# Patient Record
Sex: FEMALE | Race: WHITE | NOT HISPANIC OR LATINO | URBAN - METROPOLITAN AREA
[De-identification: names, ages, dates, MRNs, and addresses within clinical notes are randomized per-mention and may not be internally consistent; named-entity substitution may affect disease eponyms.]

---

## 2020-07-25 ENCOUNTER — TELEPHONE ENCOUNTER (OUTPATIENT)
Dept: URBAN - METROPOLITAN AREA CLINIC 13 | Facility: CLINIC | Age: 60
End: 2020-07-25

## 2020-07-26 ENCOUNTER — TELEPHONE ENCOUNTER (OUTPATIENT)
Dept: URBAN - METROPOLITAN AREA CLINIC 13 | Facility: CLINIC | Age: 60
End: 2020-07-26

## 2020-07-26 RX ORDER — SPIRONOLACTONE 25 MG/1
TAKE 1 TABLET DAILY TABLET ORAL
Refills: 0 | Status: ACTIVE | COMMUNITY

## 2020-07-26 RX ORDER — FOLIC ACID 0.8 MG
TAKE AS DIRECTED TABLET ORAL
Refills: 0 | Status: ACTIVE | COMMUNITY

## 2020-07-26 RX ORDER — TRAZODONE HYDROCHLORIDE 50 MG/1
TAKE 1 TABLET BEDTIME TABLET ORAL
Qty: 30 | Refills: 6 | Status: ACTIVE | COMMUNITY

## 2020-07-26 RX ORDER — FUROSEMIDE 20 MG/1
TAKE 1 TABLET BY MOUTH DAILY TABLET ORAL
Qty: 30 | Refills: 0 | Status: ACTIVE | COMMUNITY

## 2020-07-26 RX ORDER — METOPROLOL TARTRATE 25 MG/1
TAKE 1 TABLET DAILY TABLET, FILM COATED ORAL
Refills: 0 | Status: ACTIVE | COMMUNITY

## 2020-07-26 RX ORDER — OMEPRAZOLE 40 MG/1
TAKE 1 CAPSULE DAILY CAPSULE, DELAYED RELEASE ORAL
Qty: 30 | Refills: 1 | Status: ACTIVE | COMMUNITY

## 2020-07-26 RX ORDER — POTASSIUM CHLORIDE 1500 MG/1
TAKE 1 TABLET DAILY TABLET, EXTENDED RELEASE ORAL
Refills: 0 | Status: ACTIVE | COMMUNITY

## 2020-07-26 RX ORDER — ALENDRONATE SODIUM 70 MG/1
TAKE 1 TABLET ONCE WEEKLY TABLET ORAL
Refills: 0 | Status: ACTIVE | COMMUNITY

## 2020-07-26 RX ORDER — LACTULOSE 10 G/15ML
TAKE 15 ML BY MOUTH THREE TIMES DAILY SOLUTION ORAL
Qty: 4050 | Refills: 3 | Status: ACTIVE | COMMUNITY

## 2020-07-26 RX ORDER — TRAMADOL HYDROCHLORIDE 50 MG/1
TAKE 1 TABLET BY MOUTH EVERY 6 HOURS AS NEEDED FOR PAIN TABLET ORAL
Qty: 50 | Refills: 3 | Status: ACTIVE | COMMUNITY

## 2022-03-04 ENCOUNTER — OFFICE VISIT (OUTPATIENT)
Dept: URBAN - METROPOLITAN AREA CLINIC 113 | Facility: CLINIC | Age: 62
End: 2022-03-04

## 2022-03-17 ENCOUNTER — OFFICE VISIT (OUTPATIENT)
Dept: URBAN - METROPOLITAN AREA CLINIC 113 | Facility: CLINIC | Age: 62
End: 2022-03-17

## 2022-05-02 ENCOUNTER — OFFICE VISIT (OUTPATIENT)
Dept: URBAN - METROPOLITAN AREA CLINIC 113 | Facility: CLINIC | Age: 62
End: 2022-05-02
Payer: MEDICARE

## 2022-05-02 VITALS
SYSTOLIC BLOOD PRESSURE: 96 MMHG | HEART RATE: 58 BPM | DIASTOLIC BLOOD PRESSURE: 56 MMHG | TEMPERATURE: 97.3 F | RESPIRATION RATE: 20 BRPM | HEIGHT: 64 IN

## 2022-05-02 DIAGNOSIS — K72.90 HEPATIC ENCEPHALOPATHY: ICD-10-CM

## 2022-05-02 DIAGNOSIS — R74.8 ELEVATED LIVER ENZYMES: ICD-10-CM

## 2022-05-02 DIAGNOSIS — K70.30 ALCOHOLIC CIRRHOSIS OF LIVER WITHOUT ASCITES: ICD-10-CM

## 2022-05-02 PROCEDURE — 99244 OFF/OP CNSLTJ NEW/EST MOD 40: CPT | Performed by: INTERNAL MEDICINE

## 2022-05-02 RX ORDER — OMEPRAZOLE 40 MG/1
TAKE 1 CAPSULE DAILY CAPSULE, DELAYED RELEASE ORAL
Qty: 30 | Refills: 1 | Status: ACTIVE | COMMUNITY

## 2022-05-02 RX ORDER — TRAMADOL HYDROCHLORIDE 50 MG/1
TAKE 1 TABLET BY MOUTH EVERY 6 HOURS AS NEEDED FOR PAIN TABLET ORAL
Qty: 50 | Refills: 3 | Status: ACTIVE | COMMUNITY

## 2022-05-02 RX ORDER — TRAZODONE HYDROCHLORIDE 50 MG/1
TAKE 1 TABLET BEDTIME TABLET ORAL
Qty: 30 | Refills: 6 | Status: ACTIVE | COMMUNITY

## 2022-05-02 RX ORDER — FOLIC ACID 0.8 MG
TAKE AS DIRECTED TABLET ORAL
Refills: 0 | Status: ACTIVE | COMMUNITY

## 2022-05-02 RX ORDER — SPIRONOLACTONE 25 MG/1
TAKE 1 TABLET DAILY TABLET ORAL
Refills: 0 | Status: ACTIVE | COMMUNITY

## 2022-05-02 RX ORDER — POTASSIUM CHLORIDE 1500 MG/1
TAKE 1 TABLET DAILY TABLET, EXTENDED RELEASE ORAL
Refills: 0 | Status: ACTIVE | COMMUNITY

## 2022-05-02 RX ORDER — FUROSEMIDE 20 MG/1
TAKE 1 TABLET BY MOUTH DAILY TABLET ORAL
Qty: 30 | Refills: 0 | Status: ACTIVE | COMMUNITY

## 2022-05-02 RX ORDER — ALENDRONATE SODIUM 70 MG/1
TAKE 1 TABLET ONCE WEEKLY TABLET ORAL
Refills: 0 | Status: ACTIVE | COMMUNITY

## 2022-05-02 RX ORDER — LACTULOSE 10 G/15ML
TAKE 15 ML BY MOUTH THREE TIMES DAILY SOLUTION ORAL
Qty: 4050 | Refills: 3 | Status: ACTIVE | COMMUNITY

## 2022-05-02 RX ORDER — METOPROLOL TARTRATE 25 MG/1
TAKE 1 TABLET DAILY TABLET, FILM COATED ORAL
Refills: 0 | Status: ACTIVE | COMMUNITY

## 2022-05-02 NOTE — HPI-TODAY'S VISIT:
The patient was referred by Dr. Austin Faye for elevated liver enzymes .   A copy of this document is being forwarded to the referring provider.  61-year-old female referred by her primary care physician for elevated liver enzymes.  Her most recent labs were performed on February 8, 2022.  She did have a sodium of 142, potassium 4.5, creatinine 0.99, ammonia 113.  The remainder of her labs were not sent.  She did have an ultrasound performed which demonstrated a cirrhotic appearance to her liver.  There is no evidence of ascites or any discrete mass.  She was previously seen in our clinic in 2015 for alcoholic cirrhosis.  At that time she had a history of hepatitis C diagnosed over 10 years ago possibly from a blood transfusion.  We did schedule an upper endoscopy, and abdominal ultrasound, hepatitis C studies, and recommended lactulose, diuretics, and a PPI.  She did not follow up with us.  She states she was then seen by Dr. Goncalves and maybe had an EGD/CSC about 3 years ago. She currently feels "ok". Sometimes she has confusion and feels "ill". She says that the lactulose is helping her alot. She does not drink an ETOH since 2014. She denies any melena or hematemesis.

## 2022-05-03 LAB
A/G RATIO: 1
AFP, SERUM, TUMOR MARKER: 2.1
ALBUMIN: 2.4
ALKALINE PHOSPHATASE: 111
ALT (SGPT): 22
AST (SGOT): 33
BASO (ABSOLUTE): 0.1
BASOS: 1
BILIRUBIN, TOTAL: 1.8
BUN/CREATININE RATIO: 10
BUN: 8
CALCIUM: 7.9
CARBON DIOXIDE, TOTAL: 23
CHLORIDE: 108
CREATININE: 0.77
EGFR: 87
EOS (ABSOLUTE): 0.4
EOS: 5
GLOBULIN, TOTAL: 2.4
GLUCOSE: 73
HEMATOCRIT: 34.8
HEMATOLOGY COMMENTS:: (no result)
HEMOGLOBIN: 11.7
IMMATURE CELLS: (no result)
IMMATURE GRANS (ABS): 0
IMMATURE GRANULOCYTES: 0
INR: 1.3
LYMPHS (ABSOLUTE): 2.3
LYMPHS: 32
MCH: 31.7
MCHC: 33.6
MCV: 94
MONOCYTES(ABSOLUTE): 0.7
MONOCYTES: 10
NEUTROPHILS (ABSOLUTE): 3.6
NEUTROPHILS: 52
NRBC: (no result)
PLATELETS: 171
POTASSIUM: 4.6
PROTEIN, TOTAL: 4.8
PROTHROMBIN TIME: 13
RBC: 3.69
RDW: 15.6
SODIUM: 141
WBC: 7.1

## 2022-05-18 ENCOUNTER — OFFICE VISIT (OUTPATIENT)
Dept: URBAN - METROPOLITAN AREA MEDICAL CENTER 19 | Facility: MEDICAL CENTER | Age: 62
End: 2022-05-18

## 2022-05-27 ENCOUNTER — TELEPHONE ENCOUNTER (OUTPATIENT)
Dept: URBAN - METROPOLITAN AREA CLINIC 113 | Facility: CLINIC | Age: 62
End: 2022-05-27

## 2022-05-31 ENCOUNTER — OFFICE VISIT (OUTPATIENT)
Dept: URBAN - METROPOLITAN AREA CLINIC 113 | Facility: CLINIC | Age: 62
End: 2022-05-31

## 2022-05-31 NOTE — HPI-TODAY'S VISIT:
61-year-old female presents for follow-up after EGD.  She was last seen on 5/2/2022 as a referral for elevated liver enzymes.  She did admit to a history of cirrhosis and lack of previous EGD for varices surveillance.  Cirrhosis is suspected to be secondary to alcohol use though patient denied alcohol consumption since 2014.  She was recommended to continue her lactulose and Xifaxan as well as her current diuretic regimen.  She was therefore planned for an EGD with possible banding.  She was also planned for updated blood work to include CBC, CMP, PT/INR, AFP.  Prior colonoscopy records were requested from Dr. Goncalves.  Patient wishes to have care closer to her home in [place] so she may follow-up with Dr. Goncalves in [place].  We would continue her care here in [place] for now.  EGD was scheduled for 5/18/2022 however this was canceled for unknown reasons.   Labs 5/2/2022:Normal AFP, mildly elevated TB 1.8, normal ALP, normal AST/ALT, low albumin 2.4, normal sodium 141, normal creatinine 0.77, normal CBC, elevated PT/INR 13/1.3.  Meld score of 12.   5/2/22: The patient was referred by Dr. Austin Faye for elevated liver enzymes .   A copy of this document is being forwarded to the referring provider.  61-year-old female referred by her primary care physician for elevated liver enzymes.  Her most recent labs were performed on February 8, 2022.  She did have a sodium of 142, potassium 4.5, creatinine 0.99, ammonia 113.  The remainder of her labs were not sent.  She did have an ultrasound performed which demonstrated a cirrhotic appearance to her liver.  There is no evidence of ascites or any discrete mass.  She was previously seen in our clinic in 2015 for alcoholic cirrhosis.  At that time she had a history of hepatitis C diagnosed over 10 years ago possibly from a blood transfusion.  We did schedule an upper endoscopy, and abdominal ultrasound, hepatitis C studies, and recommended lactulose, diuretics, and a PPI.  She did not follow up with us.  She states she was then seen by Dr. Goncalves and maybe had an EGD/CSC about 3 years ago. She currently feels "ok". Sometimes she has confusion and feels "ill". She says that the lactulose is helping her alot. She does not drink an ETOH since 2014. She denies any melena or hematemesis.

## 2022-07-12 ENCOUNTER — OFFICE VISIT (OUTPATIENT)
Dept: URBAN - METROPOLITAN AREA MEDICAL CENTER 19 | Facility: MEDICAL CENTER | Age: 62
End: 2022-07-12
Payer: MEDICARE

## 2022-07-12 DIAGNOSIS — K74.60 ADVANCED CIRRHOSIS: ICD-10-CM

## 2022-07-12 DIAGNOSIS — K31.89 ACQUIRED DEFORMITY OF DUODENUM: ICD-10-CM

## 2022-07-12 PROCEDURE — 43235 EGD DIAGNOSTIC BRUSH WASH: CPT | Performed by: INTERNAL MEDICINE

## 2022-07-12 RX ORDER — TRAZODONE HYDROCHLORIDE 50 MG/1
TAKE 1 TABLET BEDTIME TABLET ORAL
Qty: 30 | Refills: 6 | Status: ACTIVE | COMMUNITY

## 2022-07-12 RX ORDER — OMEPRAZOLE 40 MG/1
TAKE 1 CAPSULE DAILY CAPSULE, DELAYED RELEASE ORAL
Qty: 30 | Refills: 1 | Status: ACTIVE | COMMUNITY

## 2022-07-12 RX ORDER — POTASSIUM CHLORIDE 1500 MG/1
TAKE 1 TABLET DAILY TABLET, EXTENDED RELEASE ORAL
Refills: 0 | Status: ACTIVE | COMMUNITY

## 2022-07-12 RX ORDER — METOPROLOL TARTRATE 25 MG/1
TAKE 1 TABLET DAILY TABLET, FILM COATED ORAL
Refills: 0 | Status: ACTIVE | COMMUNITY

## 2022-07-12 RX ORDER — FOLIC ACID 0.8 MG
TAKE AS DIRECTED TABLET ORAL
Refills: 0 | Status: ACTIVE | COMMUNITY

## 2022-07-12 RX ORDER — ALENDRONATE SODIUM 70 MG/1
TAKE 1 TABLET ONCE WEEKLY TABLET ORAL
Refills: 0 | Status: ACTIVE | COMMUNITY

## 2022-07-12 RX ORDER — FUROSEMIDE 20 MG/1
TAKE 1 TABLET BY MOUTH DAILY TABLET ORAL
Qty: 30 | Refills: 0 | Status: ACTIVE | COMMUNITY

## 2022-07-12 RX ORDER — SPIRONOLACTONE 25 MG/1
TAKE 1 TABLET DAILY TABLET ORAL
Refills: 0 | Status: ACTIVE | COMMUNITY

## 2022-07-12 RX ORDER — LACTULOSE 10 G/15ML
TAKE 15 ML BY MOUTH THREE TIMES DAILY SOLUTION ORAL
Qty: 4050 | Refills: 3 | Status: ACTIVE | COMMUNITY

## 2022-07-12 RX ORDER — TRAMADOL HYDROCHLORIDE 50 MG/1
TAKE 1 TABLET BY MOUTH EVERY 6 HOURS AS NEEDED FOR PAIN TABLET ORAL
Qty: 50 | Refills: 3 | Status: ACTIVE | COMMUNITY

## 2022-07-26 ENCOUNTER — WEB ENCOUNTER (OUTPATIENT)
Dept: URBAN - METROPOLITAN AREA CLINIC 113 | Facility: CLINIC | Age: 62
End: 2022-07-26

## 2022-07-26 ENCOUNTER — OFFICE VISIT (OUTPATIENT)
Dept: URBAN - METROPOLITAN AREA CLINIC 113 | Facility: CLINIC | Age: 62
End: 2022-07-26
Payer: MEDICARE

## 2022-07-26 ENCOUNTER — DASHBOARD ENCOUNTERS (OUTPATIENT)
Age: 62
End: 2022-07-26

## 2022-07-26 VITALS
HEIGHT: 64 IN | SYSTOLIC BLOOD PRESSURE: 120 MMHG | TEMPERATURE: 97.6 F | HEART RATE: 57 BPM | DIASTOLIC BLOOD PRESSURE: 68 MMHG | RESPIRATION RATE: 20 BRPM

## 2022-07-26 DIAGNOSIS — K72.90 HEPATIC ENCEPHALOPATHY: ICD-10-CM

## 2022-07-26 DIAGNOSIS — R74.8 ELEVATED LIVER ENZYMES: ICD-10-CM

## 2022-07-26 DIAGNOSIS — K70.30 ALCOHOLIC CIRRHOSIS OF LIVER WITHOUT ASCITES: ICD-10-CM

## 2022-07-26 PROBLEM — 420054005: Status: ACTIVE | Noted: 2022-05-02

## 2022-07-26 PROCEDURE — 99213 OFFICE O/P EST LOW 20 MIN: CPT

## 2022-07-26 RX ORDER — CYANOCOBALAMIN (VITAMIN B-12) 500 MCG
1/2 TABLET TABLET ORAL ONCE A DAY
Status: ACTIVE | COMMUNITY

## 2022-07-26 RX ORDER — POTASSIUM CHLORIDE 1500 MG/1
TAKE 1 TABLET DAILY TABLET, EXTENDED RELEASE ORAL
Refills: 0 | Status: ACTIVE | COMMUNITY

## 2022-07-26 RX ORDER — FLUTICASONE PROPIONATE 50 UG/1
1 SPRAY IN EACH NOSTRIL SPRAY, METERED NASAL ONCE A DAY
Status: ACTIVE | COMMUNITY

## 2022-07-26 RX ORDER — SPIRONOLACTONE 25 MG/1
TAKE 1 TABLET DAILY TABLET ORAL
Refills: 0 | Status: ACTIVE | COMMUNITY

## 2022-07-26 RX ORDER — METOPROLOL TARTRATE 25 MG/1
TAKE 1 TABLET DAILY TABLET, FILM COATED ORAL TWICE A DAY
Refills: 0 | Status: ACTIVE | COMMUNITY

## 2022-07-26 RX ORDER — CETIRIZINE HCL 1 MG/ML
1 TABLET SOLUTION, ORAL ORAL ONCE A DAY
Status: ACTIVE | COMMUNITY

## 2022-07-26 RX ORDER — OMEPRAZOLE 40 MG/1
TAKE 1 CAPSULE DAILY CAPSULE, DELAYED RELEASE ORAL
Qty: 30 | Refills: 1 | Status: ON HOLD | COMMUNITY

## 2022-07-26 RX ORDER — ASCORBIC ACID 1000 MG
1 CAPSULE TABLET ORAL ONCE A DAY
Status: ACTIVE | COMMUNITY

## 2022-07-26 RX ORDER — SUCRALFATE 1 G/1
1 TABLET ON AN EMPTY STOMACH TABLET ORAL ONCE A DAY
Status: ACTIVE | COMMUNITY

## 2022-07-26 RX ORDER — FOLIC ACID 0.8 MG
TAKE AS DIRECTED TABLET ORAL
Refills: 0 | Status: ACTIVE | COMMUNITY

## 2022-07-26 RX ORDER — PANTOPRAZOLE SODIUM 40 MG/1
1 TABLET TABLET, DELAYED RELEASE ORAL ONCE A DAY
Status: ACTIVE | COMMUNITY

## 2022-07-26 RX ORDER — FUROSEMIDE 20 MG/1
TAKE 1 TABLET BY MOUTH DAILY TABLET ORAL TWICE A DAY
Refills: 0 | Status: ACTIVE | COMMUNITY

## 2022-07-26 RX ORDER — BUPROPION HYDROCHLORIDE 75 MG/1
2 TABLETS TABLET, FILM COATED ORAL TWICE A DAY
Status: ACTIVE | COMMUNITY

## 2022-07-26 RX ORDER — RIFAXIMIN 550 MG/1
1 TABLET TABLET ORAL TWICE A DAY
Status: ACTIVE | COMMUNITY

## 2022-07-26 RX ORDER — CYANOCOBALAMIN 1000 UG/ML
AS DIRECTED INJECTION INTRAMUSCULAR; SUBCUTANEOUS
Status: ACTIVE | COMMUNITY

## 2022-07-26 RX ORDER — HYDROCODONE BITARTRATE AND ACETAMINOPHEN 5; 325 MG/1; MG/1
1 TABLET AS NEEDED TABLET ORAL
Status: ACTIVE | COMMUNITY

## 2022-07-26 RX ORDER — TRAZODONE HYDROCHLORIDE 50 MG/1
TAKE 1 TABLET BEDTIME TABLET ORAL
Qty: 30 | Refills: 6 | Status: ON HOLD | COMMUNITY

## 2022-07-26 RX ORDER — ALENDRONATE SODIUM 70 MG/1
TAKE 1 TABLET ONCE WEEKLY TABLET ORAL
Refills: 0 | Status: ACTIVE | COMMUNITY

## 2022-07-26 RX ORDER — ESCITALOPRAM OXALATE 20 MG/1
1 TABLET TABLET, FILM COATED ORAL ONCE A DAY
Status: ACTIVE | COMMUNITY

## 2022-07-26 RX ORDER — CALCIUM CARBONATE 500 MG/1
1 TABLET TABLET ORAL ONCE A DAY
Status: ACTIVE | COMMUNITY

## 2022-07-26 RX ORDER — TRAMADOL HYDROCHLORIDE 50 MG/1
TAKE 1 TABLET BY MOUTH EVERY 6 HOURS AS NEEDED FOR PAIN TABLET ORAL
Qty: 50 | Refills: 3 | Status: ON HOLD | COMMUNITY

## 2022-07-26 RX ORDER — ALBUTEROL SULFATE 90 UG/1
1 PUFF AS NEEDED AEROSOL, METERED RESPIRATORY (INHALATION)
Status: ACTIVE | COMMUNITY

## 2022-07-26 RX ORDER — ASPIRIN 81 MG/1
1 TABLET TABLET, COATED ORAL ONCE A DAY
Status: ACTIVE | COMMUNITY

## 2022-07-26 RX ORDER — LACTULOSE 10 G/15ML
TAKE 15 ML BY MOUTH THREE TIMES DAILY SOLUTION ORAL
Qty: 4050 | Refills: 3 | Status: ACTIVE | COMMUNITY

## 2022-07-26 RX ORDER — ACETAMINOPHEN 325 MG
2 CAPSULE AS NEEDED TABLET ORAL
Status: ACTIVE | COMMUNITY

## 2022-07-26 NOTE — HPI-TODAY'S VISIT:
61-year-old female presents for follow-up after EGD.  She was last seen on 5/2/2022 as a referral for elevated liver enzymes.  She did admit to a history of cirrhosis and lack of previous EGD for varices surveillance.  Cirrhosis is suspected to be secondary to alcohol use though patient denied alcohol consumption since 2014.  She was recommended to continue her lactulose and Xifaxan as well as her current diuretic regimen.  She was therefore planned for an EGD with possible banding.  She was also planned for updated blood work to include CBC, CMP, PT/INR, AFP.  Prior colonoscopy records were requested from Dr. Goncalves.  Patient wishes to have care closer to her home in [place] so she may follow-up with Dr. Goncalves in [place].  We would continue her care here in [place] for now.  EGD was scheduled for 5/18/2022 however this was canceled for unknown reasons.   Labs 5/2/2022:Normal AFP, mildly elevated TB 1.8, normal ALP, normal AST/ALT, low albumin 2.4, normal sodium 141, normal creatinine 0.77, normal CBC, elevated PT/INR 13/1.3.  Meld score of 12.   EGD 7/12/2022:Performed without difficulty.  Normal esophagus.  Gastritis was noted however not biopsied.  Normal duodenum.  No specimens collected.  Repeat upper endoscopy in 3 years for screening of esophageal varices.  We do not have records from Dr. Goncalves as of yet.   She states she moved to [place] for a new nursing home however she intends to go back to Dr. Goncalves for her GI care out of [place]. She has remained abstinent since 2014. She continues to take Lactulose. She takes Lasix 20 mg BID and spironolactone 50 mg once daily. This has resolved her lower extremity swelling. She denies confusion, jaundice, icterus, hematemesis, blood per rectum, melena or abdominal pain/distention.   5/2/22: The patient was referred by Dr. Austin Faye for elevated liver enzymes .   A copy of this document is being forwarded to the referring provider.  61-year-old female referred by her primary care physician for elevated liver enzymes.  Her most recent labs were performed on February 8, 2022.  She did have a sodium of 142, potassium 4.5, creatinine 0.99, ammonia 113.  The remainder of her labs were not sent.  She did have an ultrasound performed which demonstrated a cirrhotic appearance to her liver.  There is no evidence of ascites or any discrete mass.  She was previously seen in our clinic in 2015 for alcoholic cirrhosis.  At that time she had a history of hepatitis C diagnosed over 10 years ago possibly from a blood transfusion.  We did schedule an upper endoscopy, and abdominal ultrasound, hepatitis C studies, and recommended lactulose, diuretics, and a PPI.  She did not follow up with us.  She states she was then seen by Dr. Goncalves and maybe had an EGD/CSC about 3 years ago. She currently feels "ok". Sometimes she has confusion and feels "ill". She says that the lactulose is helping her a lot. She does not drink an ETOH since 2014. She denies any melena or hematemesis.

## 2022-10-26 ENCOUNTER — OFFICE VISIT (OUTPATIENT)
Dept: URBAN - METROPOLITAN AREA CLINIC 113 | Facility: CLINIC | Age: 62
End: 2022-10-26

## 2022-10-26 RX ORDER — CYANOCOBALAMIN (VITAMIN B-12) 500 MCG
1/2 TABLET TABLET ORAL ONCE A DAY
Status: ACTIVE | COMMUNITY

## 2022-10-26 RX ORDER — FUROSEMIDE 20 MG/1
TAKE 1 TABLET BY MOUTH DAILY TABLET ORAL TWICE A DAY
Refills: 0 | Status: ACTIVE | COMMUNITY

## 2022-10-26 RX ORDER — SUCRALFATE 1 G/1
1 TABLET ON AN EMPTY STOMACH TABLET ORAL ONCE A DAY
Status: ACTIVE | COMMUNITY

## 2022-10-26 RX ORDER — TRAMADOL HYDROCHLORIDE 50 MG/1
TAKE 1 TABLET BY MOUTH EVERY 6 HOURS AS NEEDED FOR PAIN TABLET ORAL
Qty: 50 | Refills: 3 | Status: ON HOLD | COMMUNITY

## 2022-10-26 RX ORDER — FOLIC ACID 0.8 MG
TAKE AS DIRECTED TABLET ORAL
Refills: 0 | Status: ACTIVE | COMMUNITY

## 2022-10-26 RX ORDER — CETIRIZINE HCL 1 MG/ML
1 TABLET SOLUTION, ORAL ORAL ONCE A DAY
Status: ACTIVE | COMMUNITY

## 2022-10-26 RX ORDER — ASCORBIC ACID 1000 MG
1 CAPSULE TABLET ORAL ONCE A DAY
Status: ACTIVE | COMMUNITY

## 2022-10-26 RX ORDER — ALENDRONATE SODIUM 70 MG/1
TAKE 1 TABLET ONCE WEEKLY TABLET ORAL
Refills: 0 | Status: ACTIVE | COMMUNITY

## 2022-10-26 RX ORDER — SPIRONOLACTONE 25 MG/1
TAKE 1 TABLET DAILY TABLET ORAL
Refills: 0 | Status: ACTIVE | COMMUNITY

## 2022-10-26 RX ORDER — BUPROPION HYDROCHLORIDE 75 MG/1
2 TABLETS TABLET, FILM COATED ORAL TWICE A DAY
Status: ACTIVE | COMMUNITY

## 2022-10-26 RX ORDER — RIFAXIMIN 550 MG/1
1 TABLET TABLET ORAL TWICE A DAY
Status: ACTIVE | COMMUNITY

## 2022-10-26 RX ORDER — PANTOPRAZOLE SODIUM 40 MG/1
1 TABLET TABLET, DELAYED RELEASE ORAL ONCE A DAY
Status: ACTIVE | COMMUNITY

## 2022-10-26 RX ORDER — TRAZODONE HYDROCHLORIDE 50 MG/1
TAKE 1 TABLET BEDTIME TABLET ORAL
Qty: 30 | Refills: 6 | Status: ON HOLD | COMMUNITY

## 2022-10-26 RX ORDER — CYANOCOBALAMIN 1000 UG/ML
AS DIRECTED INJECTION INTRAMUSCULAR; SUBCUTANEOUS
Status: ACTIVE | COMMUNITY

## 2022-10-26 RX ORDER — ASPIRIN 81 MG/1
1 TABLET TABLET, COATED ORAL ONCE A DAY
Status: ACTIVE | COMMUNITY

## 2022-10-26 RX ORDER — ALBUTEROL SULFATE 90 UG/1
1 PUFF AS NEEDED AEROSOL, METERED RESPIRATORY (INHALATION)
Status: ACTIVE | COMMUNITY

## 2022-10-26 RX ORDER — ACETAMINOPHEN 325 MG
2 CAPSULE AS NEEDED TABLET ORAL
Status: ACTIVE | COMMUNITY

## 2022-10-26 RX ORDER — OMEPRAZOLE 40 MG/1
TAKE 1 CAPSULE DAILY CAPSULE, DELAYED RELEASE ORAL
Qty: 30 | Refills: 1 | Status: ON HOLD | COMMUNITY

## 2022-10-26 RX ORDER — LACTULOSE 10 G/15ML
TAKE 15 ML BY MOUTH THREE TIMES DAILY SOLUTION ORAL
Qty: 4050 | Refills: 3 | Status: ACTIVE | COMMUNITY

## 2022-10-26 RX ORDER — ESCITALOPRAM OXALATE 20 MG/1
1 TABLET TABLET, FILM COATED ORAL ONCE A DAY
Status: ACTIVE | COMMUNITY

## 2022-10-26 RX ORDER — HYDROCODONE BITARTRATE AND ACETAMINOPHEN 5; 325 MG/1; MG/1
1 TABLET AS NEEDED TABLET ORAL
Status: ACTIVE | COMMUNITY

## 2022-10-26 RX ORDER — CALCIUM CARBONATE 500 MG/1
1 TABLET TABLET ORAL ONCE A DAY
Status: ACTIVE | COMMUNITY

## 2022-10-26 RX ORDER — METOPROLOL TARTRATE 25 MG/1
TAKE 1 TABLET DAILY TABLET, FILM COATED ORAL TWICE A DAY
Refills: 0 | Status: ACTIVE | COMMUNITY

## 2022-10-26 RX ORDER — FLUTICASONE PROPIONATE 50 UG/1
1 SPRAY IN EACH NOSTRIL SPRAY, METERED NASAL ONCE A DAY
Status: ACTIVE | COMMUNITY

## 2022-10-26 RX ORDER — POTASSIUM CHLORIDE 1500 MG/1
TAKE 1 TABLET DAILY TABLET, EXTENDED RELEASE ORAL
Refills: 0 | Status: ACTIVE | COMMUNITY

## 2022-10-26 NOTE — HPI-OTHER HISTORIES
Labs 5/2/2022:Normal AFP, mildly elevated TB 1.8, normal ALP, normal AST/ALT, low albumin 2.4, normal sodium 141, normal creatinine 0.77, normal CBC, elevated PT/INR 13/1.3.   EGD 7/12/2022:Performed without difficulty.  Normal esophagus.  Gastritis was noted however not biopsied.  Normal duodenum.  No specimens collected.  Repeat upper endoscopy in 3 years for screening of esophageal varices.

## 2022-10-26 NOTE — HPI-TODAY'S VISIT:
62-year-old female with a history of cirrhosis secondary to alcohol consumption, well compensated, presents for follow-up.  She was last seen on 7/26/2022.  Current meld score was 12.  She remained sober since 2014.  Recent EGD for screening of esophageal varices was unremarkable.  She was to continue her current diuretic therapy, Xifaxan and lactulose with titration to effect.  She is planned for repeat labs in August and ultrasound in September.  She wishes to have care closer to her home in [place] with Dr. Goncalves.  For now we will continue her care here in [place].  Abdominal ultrasound 9/15/2022:Cholelithiasis without tenderness, pericholecystic fluid, gallbladder wall thickening, biliary dilatation or biliary filling defect.  Hepatic cirrhosis without liver neoplasm or right upper quadrant ascites. We do not have updated labs.   7/26/2022: 61-year-old female presents for follow-up after EGD.  She was last seen on 5/2/2022 as a referral for elevated liver enzymes.  She did admit to a history of cirrhosis and lack of previous EGD for varices surveillance.  Cirrhosis is suspected to be secondary to alcohol use though patient denied alcohol consumption since 2014.  She was recommended to continue her lactulose and Xifaxan as well as her current diuretic regimen.  She was therefore planned for an EGD with possible banding.  She was also planned for updated blood work to include CBC, CMP, PT/INR, AFP.  Prior colonoscopy records were requested from Dr. Goncalves.  Patient wishes to have care closer to her home in [place] so she may follow-up with Dr. Goncalves in [place].  We would continue her care here in [place] for now.  EGD was scheduled for 5/18/2022 however this was canceled for unknown reasons.   Meld score of 12.   We do not have records from Dr. Goncalves as of yet.   She states she moved to [place] for a new nursing home however she intends to go back to Dr. Goncalves for her GI care [place]. She has remained abstinent since 2014. She continues to take Lactulose. She takes Lasix 20 mg BID and spironolactone 50 mg once daily. This has resolved her lower extremity swelling. She denies confusion, jaundice, icterus, hematemesis, blood per rectum, melena or abdominal pain/distention.   5/2/22: The patient was referred by Dr. Austin Faye for elevated liver enzymes .   A copy of this document is being forwarded to the referring provider.  61-year-old female referred by her primary care physician for elevated liver enzymes.  Her most recent labs were performed on February 8, 2022.  She did have a sodium of 142, potassium 4.5, creatinine 0.99, ammonia 113.  The remainder of her labs were not sent.  She did have an ultrasound performed which demonstrated a cirrhotic appearance to her liver.  There is no evidence of ascites or any discrete mass.  She was previously seen in our clinic in 2015 for alcoholic cirrhosis.  At that time she had a history of hepatitis C diagnosed over 10 years ago possibly from a blood transfusion.  We did schedule an upper endoscopy, and abdominal ultrasound, hepatitis C studies, and recommended lactulose, diuretics, and a PPI.  She did not follow up with us.  She states she was then seen by Dr. Goncalves and maybe had an EGD/CSC about 3 years ago. She currently feels "ok". Sometimes she has confusion and feels "ill". She says that the lactulose is helping her a lot. She does not drink an ETOH since 2014. She denies any melena or hematemesis.